# Patient Record
Sex: FEMALE | Race: WHITE | NOT HISPANIC OR LATINO | Employment: OTHER | URBAN - METROPOLITAN AREA
[De-identification: names, ages, dates, MRNs, and addresses within clinical notes are randomized per-mention and may not be internally consistent; named-entity substitution may affect disease eponyms.]

---

## 2023-12-07 ENCOUNTER — CONSULT (OUTPATIENT)
Dept: ALLERGY | Facility: CLINIC | Age: 78
End: 2023-12-07
Payer: MEDICARE

## 2023-12-07 VITALS
DIASTOLIC BLOOD PRESSURE: 77 MMHG | HEART RATE: 72 BPM | BODY MASS INDEX: 24.17 KG/M2 | WEIGHT: 128 LBS | HEIGHT: 61 IN | SYSTOLIC BLOOD PRESSURE: 129 MMHG

## 2023-12-07 DIAGNOSIS — Z91.018 FOOD ALLERGY: ICD-10-CM

## 2023-12-07 DIAGNOSIS — H10.45 CHRONIC ALLERGIC CONJUNCTIVITIS: ICD-10-CM

## 2023-12-07 DIAGNOSIS — J32.8 OTHER CHRONIC SINUSITIS: ICD-10-CM

## 2023-12-07 DIAGNOSIS — J30.1 ALLERGIC RHINITIS DUE TO POLLEN, UNSPECIFIED SEASONALITY: Primary | ICD-10-CM

## 2023-12-07 PROCEDURE — 99204 OFFICE O/P NEW MOD 45 MIN: CPT | Performed by: ALLERGY & IMMUNOLOGY

## 2023-12-07 PROCEDURE — 95004 PERQ TESTS W/ALRGNC XTRCS: CPT | Performed by: ALLERGY & IMMUNOLOGY

## 2023-12-07 RX ORDER — AMOXICILLIN AND CLAVULANATE POTASSIUM 875; 125 MG/1; MG/1
1 TABLET, FILM COATED ORAL EVERY 12 HOURS
COMMUNITY
Start: 2023-07-05 | End: 2023-12-07 | Stop reason: ALTCHOICE

## 2023-12-07 RX ORDER — LOSARTAN POTASSIUM 100 MG/1
100 TABLET ORAL
COMMUNITY
Start: 2022-10-03

## 2023-12-07 RX ORDER — PREDNISONE 10 MG/1
TABLET ORAL
COMMUNITY
Start: 2023-07-05 | End: 2024-03-06 | Stop reason: ALTCHOICE

## 2023-12-07 RX ORDER — PREDNISOLONE ACETATE 10 MG/ML
1 SUSPENSION/ DROPS OPHTHALMIC 3 TIMES DAILY
COMMUNITY
Start: 2023-05-22

## 2023-12-07 RX ORDER — DEXTROMETHORPHAN POLISTIREX 30 MG/5 ML
SUSPENSION, EXTENDED RELEASE 12 HR ORAL
COMMUNITY

## 2023-12-07 RX ORDER — ESTRADIOL 1 MG/1
TABLET ORAL
COMMUNITY
Start: 2023-09-13

## 2023-12-07 RX ORDER — FLUTICASONE PROPIONATE 50 MCG
2 SPRAY, SUSPENSION (ML) NASAL DAILY
COMMUNITY
Start: 2023-07-05

## 2023-12-07 RX ORDER — OFLOXACIN 3 MG/ML
1 SOLUTION/ DROPS OPHTHALMIC
COMMUNITY
Start: 2022-09-20

## 2023-12-07 RX ORDER — PRAVASTATIN SODIUM 20 MG/1
20 TABLET ORAL
COMMUNITY
Start: 2022-10-03

## 2023-12-07 RX ORDER — AZELASTINE 1 MG/ML
1 SPRAY, METERED NASAL 2 TIMES DAILY
COMMUNITY
Start: 2023-07-05 | End: 2024-03-06 | Stop reason: ALTCHOICE

## 2023-12-07 RX ORDER — ASCORBIC ACID 500 MG
500 TABLET ORAL
COMMUNITY

## 2023-12-07 NOTE — PROGRESS NOTES
"Subjective   Patient ID:   [id number]   Cee Hollingsworth is a 78 y.o. female who presents for Allergies (NPV REFERRED BY ENT FOR SMALL BENIGN MENINGIOMA ).    Chief Complaint   Patient presents with    Allergies     NPV REFERRED BY ENT FOR SMALL BENIGN MENINGIOMA           HPI  This patient is here to evaluate for:  Allergic rhinitis and conjunctivitis and food allergies    April '23 - had lightheadedness and full body weakness  MRI   Also it showed pansinusitis    Then saw an ENT and tx with an antibiotic     She had allergy testing in the 1980's so here to be retested.     Avoiding strawberry, rice, coffee, and cinnamon based on testing. Was told she can have small amounts but generally avoids this. She denies any allergic reactions in the past year.     Reports hx of allergy to Molds, plants, weeds  She had a cold that would not go away.    She currently uses fluticasone and prn azelastine.   And sinus rinses at bedtime  But doesn't feel different using these or not using them.       Review of Systems   All other systems reviewed and are negative.        Objective     /77 (BP Location: Right arm, Patient Position: Sitting, BP Cuff Size: Adult)   Pulse 72   Ht 1.549 m (5' 1\")   Wt 58.1 kg (128 lb)   BMI 24.19 kg/m²      Physical Exam  Vitals and nursing note reviewed.   Constitutional:       Appearance: Normal appearance. She is normal weight.   HENT:      Head: Normocephalic and atraumatic.      Right Ear: External ear normal.      Left Ear: External ear normal.      Nose: No septal deviation, congestion or rhinorrhea.      Right Turbinates: Not enlarged, swollen or pale.      Left Turbinates: Not enlarged, swollen or pale.      Mouth/Throat:      Mouth: Mucous membranes are moist.   Eyes:      Extraocular Movements: Extraocular movements intact.      Conjunctiva/sclera: Conjunctivae normal.      Pupils: Pupils are equal, round, and reactive to light.   Cardiovascular:      Rate and Rhythm: Normal rate and " regular rhythm.      Pulses: Normal pulses.      Heart sounds: Normal heart sounds.   Pulmonary:      Effort: Pulmonary effort is normal.      Breath sounds: Normal breath sounds. No wheezing, rhonchi or rales.   Musculoskeletal:         General: Normal range of motion.   Skin:     General: Skin is warm and dry.      Findings: No erythema or rash.   Neurological:      General: No focal deficit present.      Mental Status: She is alert and oriented to person, place, and time.   Psychiatric:         Mood and Affect: Mood normal.         Behavior: Behavior normal.            Current Outpatient Medications   Medication Sig Dispense Refill    amoxicillin-pot clavulanate (Augmentin) 875-125 mg tablet Take 1 tablet (875 mg) by mouth every 12 hours.      azelastine (Astelin) 137 mcg (0.1 %) nasal spray Administer 1 spray into each nostril 2 times a day.      estradiol (Estrace) 1 mg tablet       fluticasone (Flonase) 50 mcg/actuation nasal spray 2 sprays once daily. Shake liquid prior to use.      losartan (Cozaar) 100 mg tablet Take 1 tablet (100 mg) by mouth once daily.      ofloxacin (Ocuflox) 0.3 % ophthalmic solution Administer 1 drop into both eyes every 2 hours.      pravastatin (Pravachol) 20 mg tablet Take 1 tablet (20 mg) by mouth once daily.      prednisoLONE acetate (Pred-Forte) 1 % ophthalmic suspension Administer 1 drop into both eyes 3 times a day.      predniSONE (Deltasone) 10 mg tablet       ascorbic acid (Vitamin C) 500 mg tablet Take 1 tablet (500 mg) by mouth once daily.      calcium-vitamin D3-vitamin K 500 mg-1,000 unit-40 mcg tablet,chewable as directed Orally       No current facility-administered medications for this visit.       Assessment/Plan       It was a pleasure to meet you and we are happy to welcome you to our office. We would be happy to see you at either of our  office locations in [facility] or [place].    We performed allergy testing to help determine the etiology of your symptoms.  We discussed the results of the testing. Also, we started to focus on treating your problem and we reviewed the management plan.    You have some outdoor allergies as you are aware but the food testing was negative.     We discussed the difference between food allergy and food sensitivities. Food allergy is based on the immune system and may result in serious or life-threatening allergic reactions. Sensitivities can cause equally bothersome reactions but are not life-threatening. At this time, the testing performed supports that you have food sensitivities. We discussed avoidance measures and a treatment plan.    We discussed options of adding in some of your culprit foods but I understand your preference is to continue the food avoidance. I think this is a good, reasonable plan.    Continue the nasal sprays as per ENT recommendations.     I would be happy to see you again as needed. Please feel free to contact my office to schedule a follow-up with our office at [phone].    Gerardo Alba MD

## 2024-03-06 ENCOUNTER — OFFICE VISIT (OUTPATIENT)
Dept: OTOLARYNGOLOGY | Facility: CLINIC | Age: 79
End: 2024-03-06
Payer: MEDICARE

## 2024-03-06 VITALS — WEIGHT: 128 LBS | BODY MASS INDEX: 23.55 KG/M2 | HEIGHT: 62 IN

## 2024-03-06 DIAGNOSIS — J34.89 NASAL DRAINAGE: ICD-10-CM

## 2024-03-06 DIAGNOSIS — R44.8 FACIAL PRESSURE: ICD-10-CM

## 2024-03-06 DIAGNOSIS — Z91.09 ENVIRONMENTAL ALLERGIES: ICD-10-CM

## 2024-03-06 DIAGNOSIS — J32.9 CHRONIC RHINOSINUSITIS: Primary | ICD-10-CM

## 2024-03-06 DIAGNOSIS — R09.81 NASAL CONGESTION: ICD-10-CM

## 2024-03-06 PROBLEM — E78.49 OTHER HYPERLIPIDEMIA: Status: ACTIVE | Noted: 2018-06-28

## 2024-03-06 PROBLEM — N81.89 OTHER FEMALE GENITAL PROLAPSE: Status: ACTIVE | Noted: 2023-05-31

## 2024-03-06 PROBLEM — D32.9 MENINGIOMA (MULTI): Status: ACTIVE | Noted: 2023-05-31

## 2024-03-06 PROBLEM — J32.4 CHRONIC PANSINUSITIS: Status: ACTIVE | Noted: 2023-05-31

## 2024-03-06 PROBLEM — J30.9 ALLERGIC RHINITIS: Status: ACTIVE | Noted: 2023-05-31

## 2024-03-06 PROCEDURE — 1159F MED LIST DOCD IN RCRD: CPT | Performed by: STUDENT IN AN ORGANIZED HEALTH CARE EDUCATION/TRAINING PROGRAM

## 2024-03-06 PROCEDURE — 99213 OFFICE O/P EST LOW 20 MIN: CPT | Performed by: STUDENT IN AN ORGANIZED HEALTH CARE EDUCATION/TRAINING PROGRAM

## 2024-03-06 PROCEDURE — 31231 NASAL ENDOSCOPY DX: CPT | Performed by: STUDENT IN AN ORGANIZED HEALTH CARE EDUCATION/TRAINING PROGRAM

## 2024-03-06 PROCEDURE — 1036F TOBACCO NON-USER: CPT | Performed by: STUDENT IN AN ORGANIZED HEALTH CARE EDUCATION/TRAINING PROGRAM

## 2024-03-06 RX ORDER — SOD CHLOR,BICARB/SQUEEZ BOTTLE
PACKET, WITH RINSE DEVICE NASAL DAILY
COMMUNITY
Start: 2023-07-05

## 2024-03-06 RX ORDER — AZELASTINE 1 MG/ML
1 SPRAY, METERED NASAL 2 TIMES DAILY PRN
Qty: 36 ML | Refills: 3 | Status: SHIPPED | OUTPATIENT
Start: 2024-03-06 | End: 2025-03-06

## 2024-03-06 RX ORDER — ACETAMINOPHEN 325 MG/1
325 TABLET ORAL EVERY 6 HOURS PRN
COMMUNITY

## 2024-03-06 RX ORDER — EPINEPHRINE 0.22MG
100 AEROSOL WITH ADAPTER (ML) INHALATION 2 TIMES DAILY
COMMUNITY

## 2024-03-06 RX ORDER — AMLODIPINE BESYLATE 5 MG/1
5 TABLET ORAL
COMMUNITY
Start: 2021-07-19

## 2024-03-06 NOTE — PROGRESS NOTES
Assessment  Chronic rhinosinusitis  Facial pressure  Nasal congestion  Nasal drainage  History of environmental allergies s/p immunotherapy in the 90s  History of sinonasal surgery     Plan  The patient and I discussed their current nasal / sinus symptoms as well as several therapeutic options.   Sinonasal symptomatology is controlled.  She will continue her current  nasal hygiene/ medical regimen consisting of Flonase, azelastine, saline irrigations.     The patient was instructed on the correct technique to administer the topical nasal spray aiming at the lateral nasal wall for maximal efficacy and to avoid irritation to the septum which could lead to epistaxis. I stressed consistency of usage for maximal benefit. We discussed the rationale for the timing of this therapy and the benefits and potential adverse effects of all medications given .   RTC 6m             History of Present Illness  3/6/24  The patient present for follow-up, reports occasional nasal congestion and drainage, otherwise no sinonasal complaints.    Underwent evaluation by allergy immunology, no food allergies noted  8/16/23  The patient presents for follow-up, reports significant improvement of her sinonasal symptomatology following Augmentin and prednisone taper.   Endorses slight nasal congestion and anterior posterior nasal drainage, otherwise no sinonasal complaints. Patient pressure has resolved.  Current therapy: Flonase, azelastine, saline irrigations  Recall   78-year-old female presenting for initial evaluation of multiple sinonasal complaints.     Main Symptoms: Daily sinonasal symptoms include bilateral nasal congestion/obstruction, anterior and posterior nasal drainage, facial pressure along the frontal regions bilaterally.  Duration: Years   Laterality: Bilateral  Recently had an MRI head 5/14/2023 notable for small right frontal meningioma and incidental pansinusitis.  Has a history of environmental allergies, status post  immunotherapy in the 90s  Patient denies facial pain, facial numbness, ansomia, dental pain.  No odynophagia/dysphagia/SOB/dyspnea/hoarseness/fevers/chills/weight loss/night sweats.     Current treatment:  Nasal Steroid: No  Sinus Rinse: No  Antihistamine: No  Prednisone: No  Other: None     Recent CT: None   Previous nasal/sinus surgery: Sinonasal surgery, septoplasty in the 90s.        History reviewed. No pertinent past medical history.    History reviewed. No pertinent surgical history.      Current Outpatient Medications on File Prior to Visit   Medication Sig Dispense Refill    acetaminophen (Tylenol) 325 mg tablet Take 1 tablet (325 mg) by mouth every 6 hours if needed for moderate pain (4 - 6).      amLODIPine (Norvasc) 5 mg tablet Take 1 tablet (5 mg) by mouth once daily.      ascorbic acid (Vitamin C) 500 mg tablet Take 1 tablet (500 mg) by mouth once daily.      calcium-vitamin D3-vitamin K 500 mg-1,000 unit-40 mcg tablet,chewable as directed Orally      coenzyme Q-10 100 mg capsule Take 1 capsule (100 mg) by mouth twice a day.      ECHINACEA ORAL Take by mouth once daily.      estradiol (Estrace) 1 mg tablet       fluticasone (Flonase) 50 mcg/actuation nasal spray 2 sprays once daily. Shake liquid prior to use.      losartan (Cozaar) 100 mg tablet Take 1 tablet (100 mg) by mouth once daily.      Neilmed Sinus Rinse Complete nasal rinse Administer into affected nostril(s) once daily.      ofloxacin (Ocuflox) 0.3 % ophthalmic solution Administer 1 drop into both eyes every 2 hours.      pravastatin (Pravachol) 20 mg tablet Take 1 tablet (20 mg) by mouth once daily.      prednisoLONE acetate (Pred-Forte) 1 % ophthalmic suspension Administer 1 drop into both eyes 3 times a day.      [DISCONTINUED] azelastine (Astelin) 137 mcg (0.1 %) nasal spray Administer 1 spray into each nostril 2 times a day.      [DISCONTINUED] predniSONE (Deltasone) 10 mg tablet        No current facility-administered medications on  file prior to visit.        Allergies   Allergen Reactions    Sulfa (Sulfonamide Antibiotics) Rash     Severe rash and headache         Review of Systems  A detailed 12 point ROS was performed and is negative except as noted in the intake form, HPI and/or Past Medical History        Physical Exam   CONSTITUTIONAL: Well developed, well nourished.  VOICE: Normal voice quality  RESPIRATION: Breathing comfortably, no stridor.  CV: No clubbing/cyanosis/edema in hands.  EYES: EOM Intact, sclera normal.  NEURO: Alert and oriented times 3, Cranial nerves V,VII intact and symmetric bilaterally.  HEAD AND FACE: Symmetric facial features, no masses or lesions, sinuses nontender to palpation.  SALIVARY GLANDS: Parotid and submandibular glands normal bilaterally.  EARS: Normal external ears, external auditory canals, and TMs to otoscopy  + NOSE: External nose midline, anterior rhinoscopy is normal.  Absent inferior turbinates  Slight right inferior septal deviation  ORAL CAVITY/OROPHARYNX/LIPS: Normal mucous membranes, normal floor of mouth/tongue/OP, no masses or lesions are noted.  PHARYNGEAL WALLS AND NASOPHARYNX: No masses noted. Mucosa appears clean and moist  NECK/LYMPH: No LAD, no thyroid masses. Trachea palpably midline  SKIN: Neck skin is without injury  PSYCH: Alert and oriented with appropriate mood and affect     Nasal endoscopy:  PROCEDURE NOTE     For better visualization because of septal deviation, turbinate hypertrophy nasal endoscopy was performed after verbal consent was obtained by the patient and/or guardian. Both nostrils were sprayed with a mixture of lidocaine 4% and Afrin. After a sufficient amount of time elapsed for mucosal anesthesia to take place, the nasal endoscope was advanced into the nostril.     The following areas were visualized:  Nasal passage, nasal septum, turbinates, middle meatus, nasopharynx, sinus ostia     The patient tolerated the procedure well and these structures were found to be  normal except as follows:  Absent inferior turbinates (bilateral)  Slight right inferior septal deviation  Right: Mild generalized mucosal edema, surgical changes noted along the middle meatus and ethmoid region, maxillary antrostomy patent with associated scarring. No mucopurulence.  Left: Mild generalized mucosal edema, maxillary antrostomy patent with associated scarring. Inferior meatal window patent. No mucopurulence noted.  No polyps, nor masses seen in inferior meati, middle meati, nor sphenoethmoidal recesses bilaterally.

## 2024-07-10 ENCOUNTER — LAB REQUISITION (OUTPATIENT)
Dept: LAB | Facility: HOSPITAL | Age: 79
End: 2024-07-10
Payer: MEDICARE

## 2024-07-10 DIAGNOSIS — N99.3 PROLAPSE OF VAGINAL VAULT AFTER HYSTERECTOMY: ICD-10-CM

## 2024-07-10 PROCEDURE — 88311 DECALCIFY TISSUE: CPT | Performed by: PATHOLOGY

## 2024-07-10 PROCEDURE — 88305 TISSUE EXAM BY PATHOLOGIST: CPT

## 2024-07-10 PROCEDURE — 88305 TISSUE EXAM BY PATHOLOGIST: CPT | Performed by: PATHOLOGY

## 2024-07-10 PROCEDURE — 88311 DECALCIFY TISSUE: CPT

## 2024-07-19 LAB
LABORATORY COMMENT REPORT: NORMAL
PATH REPORT.COMMENTS IMP SPEC: NORMAL
PATH REPORT.FINAL DX SPEC: NORMAL
PATH REPORT.GROSS SPEC: NORMAL
PATH REPORT.RELEVANT HX SPEC: NORMAL
PATH REPORT.TOTAL CANCER: NORMAL

## 2024-08-01 DIAGNOSIS — J32.9 CHRONIC RHINOSINUSITIS: Primary | ICD-10-CM

## 2024-08-02 RX ORDER — FLUTICASONE PROPIONATE 50 MCG
1 SPRAY, SUSPENSION (ML) NASAL 2 TIMES DAILY
Qty: 48 G | Refills: 3 | Status: SHIPPED | OUTPATIENT
Start: 2024-08-02

## 2024-09-11 ENCOUNTER — HOSPITAL ENCOUNTER (OUTPATIENT)
Dept: RADIOLOGY | Facility: CLINIC | Age: 79
Discharge: HOME | End: 2024-09-11
Payer: MEDICARE

## 2024-09-11 DIAGNOSIS — E78.49 OTHER HYPERLIPIDEMIA: ICD-10-CM

## 2024-09-11 PROCEDURE — 75571 CT HRT W/O DYE W/CA TEST: CPT
